# Patient Record
Sex: FEMALE | Race: WHITE | NOT HISPANIC OR LATINO | URBAN - METROPOLITAN AREA
[De-identification: names, ages, dates, MRNs, and addresses within clinical notes are randomized per-mention and may not be internally consistent; named-entity substitution may affect disease eponyms.]

---

## 2018-09-14 ENCOUNTER — OFFICE VISIT CONVERTED (OUTPATIENT)
Dept: OTHER | Facility: HOSPITAL | Age: 38
End: 2018-09-14
Attending: NURSE PRACTITIONER

## 2018-09-20 ENCOUNTER — OFFICE VISIT CONVERTED (OUTPATIENT)
Dept: ONCOLOGY | Facility: HOSPITAL | Age: 38
End: 2018-09-20
Attending: INTERNAL MEDICINE

## 2018-12-19 ENCOUNTER — OFFICE VISIT CONVERTED (OUTPATIENT)
Dept: ONCOLOGY | Facility: HOSPITAL | Age: 38
End: 2018-12-19
Attending: INTERNAL MEDICINE

## 2019-01-11 ENCOUNTER — HOSPITAL ENCOUNTER (OUTPATIENT)
Dept: OTHER | Facility: HOSPITAL | Age: 39
Discharge: HOME | End: 2019-01-11
Attending: NURSE PRACTITIONER

## 2019-01-11 LAB
BASOPHILS # BLD AUTO: 0.07 10*3/UL (ref 0–0.2)
BASOPHILS NFR BLD AUTO: 1.41 % (ref 0–3)
EOSINOPHIL # BLD AUTO: 0.25 10*3/UL (ref 0–0.7)
EOSINOPHIL # BLD AUTO: 5.28 % (ref 0–7)
ERYTHROCYTE [DISTWIDTH] IN BLOOD BY AUTOMATED COUNT: 12.7 % (ref 11.5–14.5)
FERRITIN SERPL-MCNC: 10 NG/ML (ref 10–200)
HBA1C MFR BLD: 11.9 G/DL (ref 12–16)
HCT VFR BLD AUTO: 34.4 % (ref 37–47)
IRON SATN MFR SERPL: 11 % (ref 20–55)
IRON SERPL-MCNC: 41 UG/DL (ref 60–170)
LYMPHOCYTES # BLD AUTO: 1.4 10*3/UL (ref 1–5)
MCH RBC QN AUTO: 32 PG (ref 27–31)
MCHC RBC AUTO-ENTMCNC: 34.5 G/DL (ref 33–37)
MCV RBC AUTO: 92.8 FL (ref 81–99)
MONOCYTES # BLD AUTO: 0.42 10*3/UL (ref 0.2–1.2)
MONOCYTES NFR BLD AUTO: 8.7 % (ref 3–10)
NEUTROPHILS # BLD AUTO: 2.67 10*3/UL (ref 2–8)
NEUTROPHILS NFR BLD AUTO: 55.6 % (ref 30–85)
NRBC BLD AUTO-RTO: 0 % (ref 0–0.01)
PLATELET # BLD AUTO: 249 10*3/UL (ref 130–400)
PMV BLD AUTO: 8.2 FL (ref 7.4–10.4)
RBC # BLD AUTO: 3.71 10*6/UL (ref 4.2–5.4)
TIBC SERPL-MCNC: 390 UG/DL (ref 245–450)
TRANSFERRIN SERPL-MCNC: 273 MG/DL (ref 250–380)
VARIANT LYMPHS NFR BLD MANUAL: 29 % (ref 20–45)
WBC # BLD AUTO: 4.81 10*3/UL (ref 4.8–10.8)

## 2019-01-22 ENCOUNTER — HOSPITAL ENCOUNTER (OUTPATIENT)
Dept: OTHER | Facility: HOSPITAL | Age: 39
Setting detail: RECURRING SERIES
Discharge: HOME | End: 2019-01-31
Attending: NURSE PRACTITIONER

## 2019-03-05 ENCOUNTER — HOSPITAL ENCOUNTER (OUTPATIENT)
Dept: ONCOLOGY | Facility: HOSPITAL | Age: 39
Discharge: HOME | End: 2019-03-05
Attending: INTERNAL MEDICINE

## 2019-03-05 ENCOUNTER — OFFICE VISIT CONVERTED (OUTPATIENT)
Dept: ONCOLOGY | Facility: HOSPITAL | Age: 39
End: 2019-03-05
Attending: NURSE PRACTITIONER

## 2019-03-05 LAB
ALBUMIN SERPL-MCNC: 4.4 G/DL (ref 3.5–5)
ALBUMIN/GLOB SERPL: 1.4 {RATIO} (ref 1.4–2.6)
ALP SERPL-CCNC: 66 U/L (ref 42–98)
ALT SERPL-CCNC: 18 U/L (ref 10–40)
ANION GAP SERPL CALC-SCNC: 14 MMOL/L (ref 8–19)
AST SERPL-CCNC: 18 U/L (ref 15–50)
BASOPHILS # BLD AUTO: 0.03 10*3/UL (ref 0–0.2)
BASOPHILS NFR BLD AUTO: 0.4 % (ref 0–3)
BILIRUB SERPL-MCNC: 0.31 MG/DL (ref 0.2–1.3)
BUN SERPL-MCNC: 11 MG/DL (ref 5–25)
BUN/CREAT SERPL: 16 {RATIO} (ref 6–20)
CALCIUM SERPL-MCNC: 9.6 MG/DL (ref 8.7–10.4)
CHLORIDE SERPL-SCNC: 104 MMOL/L (ref 99–111)
CONV ABS IMM GRAN: 0.02 10*3/UL (ref 0–0.2)
CONV CO2: 28 MMOL/L (ref 22–32)
CONV IMMATURE GRAN: 0.3 % (ref 0–1.8)
CONV TOTAL PROTEIN: 7.6 G/DL (ref 6.3–8.2)
CREAT UR-MCNC: 0.67 MG/DL (ref 0.5–0.9)
DEPRECATED RDW RBC AUTO: 47.5 FL (ref 36.4–46.3)
EOSINOPHIL # BLD AUTO: 0.02 10*3/UL (ref 0–0.7)
EOSINOPHIL # BLD AUTO: 0.3 % (ref 0–7)
ERYTHROCYTE [DISTWIDTH] IN BLOOD BY AUTOMATED COUNT: 13.7 % (ref 11.7–14.4)
FERRITIN SERPL-MCNC: 382 NG/ML (ref 10–200)
GFR SERPLBLD BASED ON 1.73 SQ M-ARVRAT: >60 ML/MIN/{1.73_M2}
GLOBULIN UR ELPH-MCNC: 3.2 G/DL (ref 2–3.5)
GLUCOSE SERPL-MCNC: 85 MG/DL (ref 65–99)
HBA1C MFR BLD: 12.6 G/DL (ref 12–16)
HCT VFR BLD AUTO: 38.7 % (ref 37–47)
IRON SATN MFR SERPL: 27 % (ref 20–55)
IRON SERPL-MCNC: 78 UG/DL (ref 60–170)
LYMPHOCYTES # BLD AUTO: 1.6 10*3/UL (ref 1–5)
MCH RBC QN AUTO: 30.8 PG (ref 27–31)
MCHC RBC AUTO-ENTMCNC: 32.6 G/DL (ref 33–37)
MCV RBC AUTO: 94.6 FL (ref 81–99)
MONOCYTES # BLD AUTO: 0.37 10*3/UL (ref 0.2–1.2)
MONOCYTES NFR BLD AUTO: 4.8 % (ref 3–10)
NEUTROPHILS # BLD AUTO: 5.6 10*3/UL (ref 2–8)
NEUTROPHILS NFR BLD AUTO: 73.3 % (ref 30–85)
NRBC CBCN: 0 % (ref 0–0.7)
OSMOLALITY SERPL CALC.SUM OF ELEC: 293 MOSM/KG (ref 273–304)
PLATELET # BLD AUTO: 275 10*3/UL (ref 130–400)
PMV BLD AUTO: 10.6 FL (ref 9.4–12.3)
POTASSIUM SERPL-SCNC: 3.6 MMOL/L (ref 3.5–5.3)
RBC # BLD AUTO: 4.09 10*6/UL (ref 4.2–5.4)
SODIUM SERPL-SCNC: 142 MMOL/L (ref 135–147)
TIBC SERPL-MCNC: 289 UG/DL (ref 245–450)
TRANSFERRIN SERPL-MCNC: 202 MG/DL (ref 250–380)
VARIANT LYMPHS NFR BLD MANUAL: 20.9 % (ref 20–45)
WBC # BLD AUTO: 7.64 10*3/UL (ref 4.8–10.8)

## 2019-08-29 ENCOUNTER — HOSPITAL ENCOUNTER (OUTPATIENT)
Dept: ONCOLOGY | Facility: HOSPITAL | Age: 39
Discharge: HOME | End: 2019-08-29
Attending: INTERNAL MEDICINE

## 2019-08-29 ENCOUNTER — OFFICE VISIT CONVERTED (OUTPATIENT)
Dept: ONCOLOGY | Facility: HOSPITAL | Age: 39
End: 2019-08-29
Attending: INTERNAL MEDICINE

## 2019-08-29 LAB
BASOPHILS # BLD AUTO: 0.03 10*3/UL (ref 0–0.2)
BASOPHILS NFR BLD AUTO: 0.8 % (ref 0–3)
CONV ABS IMM GRAN: 0 10*3/UL (ref 0–0.2)
CONV IMMATURE GRAN: 0 % (ref 0–1.8)
DEPRECATED RDW RBC AUTO: 43.1 FL (ref 36.4–46.3)
EOSINOPHIL # BLD AUTO: 0.02 10*3/UL (ref 0–0.7)
EOSINOPHIL # BLD AUTO: 0.5 % (ref 0–7)
ERYTHROCYTE [DISTWIDTH] IN BLOOD BY AUTOMATED COUNT: 12.1 % (ref 11.7–14.4)
FERRITIN SERPL-MCNC: 96 NG/ML (ref 10–200)
HCT VFR BLD AUTO: 39.3 % (ref 37–47)
HGB BLD-MCNC: 13.1 G/DL (ref 12–16)
IRON SATN MFR SERPL: 34 % (ref 20–55)
IRON SERPL-MCNC: 111 UG/DL (ref 60–170)
LYMPHOCYTES # BLD AUTO: 1.27 10*3/UL (ref 1–5)
LYMPHOCYTES NFR BLD AUTO: 32.2 % (ref 20–45)
MCH RBC QN AUTO: 32.2 PG (ref 27–31)
MCHC RBC AUTO-ENTMCNC: 33.3 G/DL (ref 33–37)
MCV RBC AUTO: 96.6 FL (ref 81–99)
MONOCYTES # BLD AUTO: 0.25 10*3/UL (ref 0.2–1.2)
MONOCYTES NFR BLD AUTO: 6.3 % (ref 3–10)
NEUTROPHILS # BLD AUTO: 2.38 10*3/UL (ref 2–8)
NEUTROPHILS NFR BLD AUTO: 60.2 % (ref 30–85)
NRBC CBCN: 0 % (ref 0–0.7)
PLATELET # BLD AUTO: 242 10*3/UL (ref 130–400)
PMV BLD AUTO: 10.4 FL (ref 9.4–12.3)
RBC # BLD AUTO: 4.07 10*6/UL (ref 4.2–5.4)
TIBC SERPL-MCNC: 327 UG/DL (ref 245–450)
TRANSFERRIN SERPL-MCNC: 229 MG/DL (ref 250–380)
WBC # BLD AUTO: 3.95 10*3/UL (ref 4.8–10.8)

## 2021-05-14 NOTE — PROGRESS NOTES
"   Progress Note      Patient Name: Lucretia Barnett   Patient ID: [id number]   Sex: Female   YOB: 1980    Primary Care Provider: Gifty NIEVES   Referring Provider: Gifty NIEVES    Visit Date: September 14, 2018    Provider: EZEQUIEL Gallardo   Location: [facility]   Location Address: [address] [place] [address]  [id number]   Location Phone: [phone]          Chief Complaint  · syncope  · anemia      History Of Present Illness  Lucretia Barnett is a 38 year old /White female who presents for evaluation and treatment of:      Syncope:  Patient reports passing out in the shower Monday morning;said she was out for a couple of minutes. Woke up in the shower with no recollection of what had happened.  Patient reports she had no difficulty with confusion afterwards. She does admit that when she woke up Monday morning and got out of bed that she felt a \"little funny\".   She said it is hard to describe.  Patient denies any injuries from the fall.     Patient works at [employer] and she went on to work that morning and they checked her sugar and it was normal according to patient.  She also had a CBC done and she was found to be anemic with hemoglobin of 9.5 and hematocrit of 30.3. Patient started an iron pill over the counter (OTC) 65 mg (4 tablets)on her own which she has been taking about four days now.  She reports she has felt better since starting it; not feeling as fatigued. Patient denies rectal bleeding, hematuria, coughing up any blood.  Patient has regular menses with her last ending 8/26/18 she reports they last 5-6 days and she reports she will pass clots during her menses, but denies that they are unusually extra heavy. Patient has never had any difficulty with anemia before.  Patient denies shortness of breath, dizziness, chest pain, or palpitations.  Patient takes no routine medications at all.       Past Medical " "History  Migraine         Medication List  Name Date Started Instructions   iron 325 mg (65 mg iron) oral tablet  take 4 tablets by oral route daily         Allergy List  NO KNOWN DRUG ALLERGIES         Family Medical History  Cancer, Unspecified         Social History  Alcohol (Never); ; Recreational Drug Use (Never); Tobacco         Review of Systems  · Constitutional  o Admits  o : weight loss, fatigue  o Denies  o : fever, chills, loss of appetite  · HENT  o Denies  o : headaches  · Cardiovascular  o Denies  o : dypnea on exertion, lower extremity edema, pain in chest, palpitations  · Respiratory  o Denies  o : shortness of breath, cough, hemoptysis  · Gastrointestinal  o Denies  o : nausea, vomiting, abdominal pain, bloating, hematemesis, melena  · Genitourinary  o Denies  o : hematuria  · Integument  o Denies  o : rash, itching, nail changes  · Neurologic  o Admits  o : altered mental status/change in consciousness, additional neurologic symptoms except as noted in the HPI  · Musculoskeletal  o Denies  o : joint pain, muscle pain, muscular weakness  · Heme-Lymph  o Denies  o : easy bruising, easy bleeding, history of excessive bleeding      Vitals  Date Time BP Position Site L\R Cuff Size HR RR TEMP(F) WT  HT  BMI kg/m2 BSA m2 O2 Sat HC       09/14/2018 02:52 /62 Sitting    60 - R 18 98 197lbs 0oz 5'  6\" 31.8 2.04 99 %           Physical Examination  · Constitutional  o Appearance  o : obese, well developed, in no acute distress  · Head and Face  o Head  o :   § Inspection  § : atraumatic, normocephalic  · Eyes  o Eyes  o : extraocular movements intact, no scleral icterus, no conjunctival injection  · Ears, Nose, Mouth and Throat  o Ears  o :   § External Ears  § : normal  § Otoscopic Examination  § : tympanic membrane appearance within normal limits bilaterally  o Nose  o :   § Intranasal Exam  § : nares patent  o Oral Cavity  o :   § Oral Mucosa  § : moist mucous membranes  o Throat  o : "   § Oropharynx  § : no inflammation or lesions present  · Neck  o Thyroid  o : nontender, no nodules or masses present on palpation, symmetric  · Respiratory  o Respiratory Effort  o : breathing comfortably, symmetric chest rise  o Auscultation of Lungs  o : clear to asculatation bilaterally, no wheezes, rales, or rhonchii  · Cardiovascular  o Heart  o :   § Auscultation of Heart  § : regular rate and rhythm, no murmurs, rubs, or gallops  o Peripheral Vascular System  o :   § Extremities  § : no edema  · Gastrointestinal  o Abdominal Examination  o :   § Abdomen  § : bowel sounds present, non-distended, non-tender  · Lymphatic  o Neck  o : no lymphadenopathy present  · Skin and Subcutaneous Tissue  o General Inspection  o : no lesions present, no areas of discoloration, skin turgor normal, pink, warm, and dry  · Neurologic  o Mental Status Examination  o :   § Orientation  § : grossly oriented to person, place and time  o Cranial Nerves  o : crainial nerves 2-12 grossly intact  o Gait and Station  o :   § Gait Screening  § : normal gait  · Psychiatric  o General  o : normal mood and affect              Assessment  · Anemia     285.9/D64.9  Labs done today in office. Hematology consult Dr. Maldonado or Dr. Harvey. Continue OTC iron supplement for now.  · Syncope     780.2/R55      Plan  · Orders  o HEMATOLOGY/ONCOLOGY CONSULTATION (HEMOC) - 285.9/D64.9 - 09/16/2018   Dr. Veza ASAP. [facility] or [place] office - whichever is sooner. Any day, any time.   Please send CBC that patient brought in from [facility] and all the labs I ordered today along with office note.  o CBC with Auto Diff [facility] (79474) - 780.2/R55, 285.9/D64.9 - 09/14/2018  o CMP [facility] (00972) - 780.2/R55, 285.9/D64.9 - 09/14/2018  o ACO-39: Current medications updated and reviewed () - - 09/14/2018  o ACO-14: Influenza immunization administered or previously received () - - 09/14/2018  o Iron Profile (Iron 15855 TIBC 74796 and Transferrin 89547) (IRONP) -  780.2/R55, 285.9/D64.9 - 09/14/2018  o Ferritin (14014) - 780.2/R55, 285.9/D64.9 - 09/14/2018  o B12 Folate levels (B12FO) - 780.2/R55, 285.9/D64.9 - 09/14/2018  o PT/INR (90857) - 780.2/R55, 285.9/D64.9 - 09/14/2018  · Instructions  o Take all medications as prescribed/directed.  o Patient was educated/instructed on their diagnosis, treatment and medications prior to discharge from the clinic today.  o Patient instructed to seek medical attention urgently for new or worsening symptoms.  o Call the office with any concerns or questions.  o Chronic conditions reviewed and taken into consideration for today's treatment plan.  · Disposition  o RTC in 3 months            Electronically Signed by: EZEQUIEL Gallardo -Author on September 16, 2018 03:10:55 PM

## 2021-05-16 VITALS
TEMPERATURE: 98 F | RESPIRATION RATE: 18 BRPM | HEIGHT: 66 IN | DIASTOLIC BLOOD PRESSURE: 62 MMHG | HEART RATE: 60 BPM | BODY MASS INDEX: 31.66 KG/M2 | OXYGEN SATURATION: 99 % | WEIGHT: 197 LBS | SYSTOLIC BLOOD PRESSURE: 110 MMHG

## 2021-05-28 VITALS
BODY MASS INDEX: 29.78 KG/M2 | SYSTOLIC BLOOD PRESSURE: 124 MMHG | RESPIRATION RATE: 16 BRPM | DIASTOLIC BLOOD PRESSURE: 76 MMHG | OXYGEN SATURATION: 100 % | OXYGEN SATURATION: 100 % | SYSTOLIC BLOOD PRESSURE: 133 MMHG | HEIGHT: 69 IN | HEART RATE: 51 BPM | TEMPERATURE: 98.9 F | WEIGHT: 210.32 LBS | DIASTOLIC BLOOD PRESSURE: 66 MMHG | HEART RATE: 50 BPM | BODY MASS INDEX: 31.15 KG/M2 | TEMPERATURE: 98.4 F | WEIGHT: 201.06 LBS

## 2021-05-28 VITALS
BODY MASS INDEX: 29.84 KG/M2 | RESPIRATION RATE: 16 BRPM | HEART RATE: 50 BPM | SYSTOLIC BLOOD PRESSURE: 121 MMHG | HEIGHT: 69 IN | DIASTOLIC BLOOD PRESSURE: 43 MMHG | OXYGEN SATURATION: 100 % | WEIGHT: 201.5 LBS | TEMPERATURE: 98.4 F

## 2021-05-28 VITALS
HEART RATE: 53 BPM | TEMPERATURE: 98.8 F | DIASTOLIC BLOOD PRESSURE: 74 MMHG | HEIGHT: 67 IN | SYSTOLIC BLOOD PRESSURE: 130 MMHG | OXYGEN SATURATION: 98 % | BODY MASS INDEX: 30.85 KG/M2

## 2021-05-28 NOTE — PROGRESS NOTES
Patient: AXEL CACERES     Acct: [id number]     Report: #[id number]  UNIT #: [id number]     : 1980    Encounter Date:2018  PRIMARY CARE: NEY GRAY  ***Signed***  --------------------------------------------------------------------------------------------------------------------  DATE: 18      Primary Care Provider:  NEY GRAY      Referring Provider:  NEY GRAY      Reason For Consult      New Patient Anemia            History of Present Illness      38-year-old white female is referred because of anemia.      Last Monday patient was bruising when she got out of bed.  She went to the     shower, felt weird and passed out.   picked her up and put her on the     bed.            She went to see her family care provider who did blood work that showed a low     white count of 3.7, hemoglobin of 10.5/hematocrit of 33.1.  Vitamin B12 was     normal.  Folic acid normal.  Iron was low at 25.  TIBC high 480, percent     saturation 5%.            Patient was referred for evaluation and treatment.            Past Medical/Surgical History             No Hypertension             No Diabetes Mellitus             No Heart Disease             No Blood Clots             No Cancer             No Lung Disease             No Kidney Disease             No Other            1 childbirth            Pyschiatric History      No Depression, No Anxiety, No Panic Attacks, No Bipolar, No Other            Social History      Social History:  No Tobacco Use, No Alcohol Use, No Recreational Drug use, No     Other            Family History      Hypertension (father); No Diabetes Mellitus, No Heart Disease, No Blood Clots;     Cancer (father); No Lung Disease, No Kidney Disease, No Other            Allergies      Coded Allergies:             No Known Drug Allergies (Verified  Allergy, 18)            Medications      (iron) Unknown Strength  No Conflict Check      PO QDAY         Reported          9/20/18      Current Medications      Current Medications Reviewed 9/20/18            Pain Assessment      Description:  None            Review of Systems      Abnormal as noted below; all other systems have been reviewed and are negative.      General:  No Anxiety; Fatigue Scale: (3/4); No Pain Scale:, No Fever, No Other      HEENT:  No Dysphagia, No Hearing Changes, No Rhinorrhea, No Tinnitus, No Visual     Changes, No Nasal Congestion, No Epistaxis, No Other      Respiratory:  No Cough, No Shortness of Air, No Sputum Changes, No Wheezing, No     Hemoptysis, No Congestion, No Other      Cardiovascular:  No Chest Pain, No Pedal Edema, No Orthopnea, No Palpitations,     No Chest Pressure, No Dizziness, No Other      Gastrointestinal:  No Nausea, No Vomiting, No Dysphagia; Constipation; No D    iarrhea; Appetite Good; No Appetite Fair, No Appetite Poor, No Early Satiety, No     Other      Genitourinary:  No Nocturia, No Dysuria, No Other      Musculoskeletal:  No Joint Effusions, No Joint Tenderness, No Joint Stiffness,     No Myalgias, No Aches, No Pains, No Other      Endocrine:  No Heat Intolerance, No Cold Intolerance, No Fatigue, No Blood Sugar     Control, No Other      Hematologic:  No Bleeding; Bruising; No Swollen Glands, No Other      Allergic/Immunologic:  No Hives, No Throat closing off, No Nasal drip, No Itchy     eyes, No Hay fever, No Other      Psychological:  No Anxiety, No Depression, No Other      Neurological:  No Headaches, No Dizziness, No Weakness, No Numbness, No Other      Skin:  No Rash, No Open Wounds, No Edema, No Other      Vitals:             Height 5 ft 7 in / 170.18 cm           Weight 1919 lbs 7 oz / 870.704020 kg           BSA 7.09 m2           .6 kg/m2           Temperature 98.8 F / 37.11 C - Temporal           Pulse 53           Blood Pressure 130/74 Sitting, Left Arm           Pulse Oximetry 98%, room air            Exam      Constitutional:  No acute distress,  Conversant, Pleasant      Eyes:  Anicteric sclerae, Palpebral Conjunctivae (pink), ABIEL      HENT:  Oropharynx clear; No Erythema; Buccal mucosae (pink)      Neck:  Supple, Full Range of Motion      Lungs:  Clear to Ausculation, Normal Respiratory Effort      Cardiovascular:  RRR; No Murmurs; Normal PMI; No Peripheral Edema      Abdomen:  Soft, NABS; No Tenderness      Skin:  Other (no dermatosis)      Extremities:  No digital cyanosis, No digital ischemia, Normal gait, Other (no     deformity , no limitation in range of motion)      Psychiatric:  Appropriate affect, Intact judgement, AAO x 3      Neurological:  Cranial Nerve II-XII; No Focal Sensory deficits      Lymphatic:  No Neck            Lab Results      Dictated in H  CMP normal      PT 12.3, INR 1.23            Impression/Problem List      Iron deficiency anemia probably from her monthly menstrual flow.            Plan      Venofer 300 mg IV Ã—2      Continue ferrous sulfate 3 times a day with vitamin C 2 times a day      Return in 2 months with CBC, iron profile, reticulocyte count.      Thank you very much for allowing me to participate in the care of your patient.            Patient Education:        Anemia            PREVENTION      Hx Influenza Vaccination:  Yes      Date Influenza Vaccine Given:  Sep 11, 2018      Influenza Vaccine Declined:  No      2 or More Falls Past Year?:  No      Fall Past Year with Injury?:  No      Encouraged to follow-up with:  PCP regarding preventative exams.      Chart initiated by      Renuka George MA                 Disclaimer: Converted document may not contain table formatting or lab diagrams. Please see [employer] for the authenticated document.

## 2021-05-28 NOTE — PROGRESS NOTES
Patient: AXEL CACERES     Acct: [id number]     Report: #[id number]  UNIT #: [id number]     : 1980    Encounter Date:2019  PRIMARY CARE: NEY GRAY [facility]  ***Signed***  --------------------------------------------------------------------------------------------------------------------  NURSE INTAKE      Visit Type      Established Patient Visit            Referring Provider/Copies To      Provider Not Found in Lookup:  NEY GRAY      PCP Not Found in Lookup:  NEY GRAY            History and Present Illness      Past History      Mrs. Caceres is a 37yo WF with HARRIS.  This was found a few months ago when she    had a near syncopal episode at work. She denies bleeding such as hematemesis,     hematochezia, melena or hematuria.  She reports she had 2 iron infusions in     November with improvement of her fatigue.  Iron profile/ferritin 18 iron 2    5, % sat 5, tibc 480 and ferritin 22--18 iron 50, % sat 15 with ferritin     17.4.  She reports taking PO iron TID and tolerating w/o issue.  She continues     to have monthly menstruation; however, reports that she does not think     excessive-uses a box of tampons per cycle.  Has not seen gyn for some time.      Denies drinking/smoking.  Unaware of family hx of anemia.            18:  Iron 25  Percent saturation 5  TIBC 480  Ferritin 22            18:  Received Iron x 2 in November.             18:  Iron 296  TIBC 402  Percent saturation 74  Ferritin 74  Hemoglobin 12     MCV 90            19:  Iron 41  TIBC 390  Percent saturation 11  Ferritin 10  Hemoglobin     11.9  MCV 92            19:  Iron 78  TIBC 289  Percent saturation 27  Ferritin 382  Hemoglobin     12.6  MCV 94            HPI - Hematology Interim      F/u HARRIS--continues with fatigue like experienced with iron deficiency.  She is     taking PO iron BID w/o issues.  Does have a menstrual cycle; however, not very     heavy.  Denies  bleeding or excess bruising at this time. No recent lab work     completed. No distress noted.            PAST, FAMILY   Past Medical History      Other PMH:        HARRIS      Hematology/Oncology (F):  Anemia            Past Surgical History      None            Family History      Family History:  Prostate Cancer (GRANDFATHER), Stomach Cancer (AUNT)            Social History      Marital Status:        Lives independently:  Yes      Number of Children:  1      Occupation:  [employer]            Tobacco Use      Tobacco status:  Never smoker            Alcohol Use      Alcohol intake:  None            Substance Use      Substance use:  Denies use            REVIEW OF SYSTEMS      General:  Admits: Fatigue      Eye:  Denies: Corrective Lenses      ENT:  Denies: Hearing Loss      Cardiovascular:  Denies: Chest Pain      Respiratory:  Denies: Shortness of Air      Musculoskeletal:  Denies: Muscle Pain      Neurologic:  Admits: Dizziness      Psychiatric:  Denies: Anxiety      Endocrine:  Denies: Diabetes            VITAL SIGNS AND SCORES      Vitals      Weight 210 lbs 5.101 oz / 95.4 kg      Temperature 98.4 F / 36.89 C - Temporal      Pulse 50      Respirations 16      Blood Pressure 124/76 Sitting, Left Arm      Pulse Oximetry 100%, RM AIR            Pain Score      Pain Scale Used:  Numerical      Pain Intensity:  0            EXAM      General Appearance:  Positive for: Alert, Oriented x3, Cooperative;          Negative for: Acute Distress      Respiratory:  Positive for: CTAB, Normal Respiratory Effort      Abdomen/Gastro:  Positive for: Normal Active Bowel Sounds, Soft;          Negative for: Distention, Hepatosplenomegaly, Tenderness      Cardiovascular:  Positive for: RRR;          Negative for: Gallop, Murmur, Peripheral Edema, Rub      Psychiatric:  Positive for: Appropriate Affect, Intact Judgement            PREVENTION      Hx Influenza Vaccination:  Yes      Date Influenza Vaccine  Given:  Sep 11, 2018      Influenza Vaccine Declined:  No      2 or More Falls Past Year?:  No      Fall Past Year with Injury?:  No      Encouraged to follow-up with:  PCP regarding preventative exams.      Chart initiated by      NOHELIA IBRAHIM MA            ALLERGY/MEDS      Allergies      Coded Allergies:             NO KNOWN DRUG ALLERGIES (Verified  Allergy, Unknown, 8/29/19)            Medications      Last Reconciled on 8/29/19 11:46 by EZEQUIEL JOLLY      Cyanocobalamin (Vitamin B-12*) 50 Mcg Tablet      50 MCG PO QDAY, TAB         Reported         12/19/18       Ferrous Sulfate (Iron Sulfate*) 325 Mg Tablet      65 MG PO QDAY, #30 TAB 0 Refills         Reported         12/19/18      Medications Reviewed:  No Changes made to meds            IMPRESSION/PLAN      Diagnosis      ANEMIA, UNSPECIFIED - D64.9            Notes      Iron deficiency anemia.  Status post IV iron.  Currently on oral iron.  Recent     upper and lower endoscopy does not identify any obvious bleeding.  She reports     more fatigue again.  Repeat lab work today including CBC and iron profile.  If     it remains low despite oral iron supplementation, additional treatment with IV     iron would be appropriate.      New Diagnostics      * CBC With Auto Diff, Routine         Dx: ANEMIA, UNSPECIFIED - D64.9      * Iron Profile, Routine         Dx: ANEMIA, UNSPECIFIED - D64.9      * Ferritin Level, Routine         Dx: ANEMIA, UNSPECIFIED - D64.9            Patient Education            Anemia      Patient Education Provided:  Yes                 Disclaimer: Converted document may not contain table formatting or lab diagrams. Please see [employer] for the authenticated document.

## 2021-05-28 NOTE — PROGRESS NOTES
Patient: AXEL CACERES     Acct: [id number]     Report: #[id number]  UNIT #: [id number]     : 1980    Encounter Date:2019  PRIMARY CARE: NEY GRAY [facility]  ***Signed***  --------------------------------------------------------------------------------------------------------------------  NURSE INTAKE      Visit Type      Established Patient Visit            Chief Complaint      HARRIS            Referring Provider/Copies To      Provider Not Found in Lookup:  NEY GRAY      PCP Not Found in Lookup:  NEY GRAY            History and Present Illness      Past History      Mrs. Caceres is a 39yo WF with HARRIS.  This was found a few months ago when she    had a near syncopal episode at work. She denies bleeding such as hematemesis,     hematochezia, melena or hematuria.  She reports she had 2 iron infusions in     November with improvement of her fatigue.  Iron profile/ferritin 18 iron     25, % sat 5, tibc 480 and ferritin 22--18 iron 50, % sat 15 with ferritin     17.4.  She reports taking PO iron TID and tolerating w/o issue.  She continues     to have monthly menstruation; however, reports that she does not think     excessive-uses a box of tampons per cycle.  Has not seen gyn for some time.      Denies drinking/smoking.  Unaware of family hx of anemia.            18:  Iron 25  Percent saturation 5  TIBC 480  Ferritin 22            18:  Received Iron x 2 in November.             18:  Iron 296  TIBC 402  Percent saturation 74  Ferritin 74  Hemoglobin 12     MCV 90            19:  Iron 41  TIBC 390  Percent saturation 11  Ferritin 10  Hemoglobin     11.9  MCV 92            19:  Iron 78  TIBC 289  Percent saturation 27  Ferritin 382  Hemoglobin     12.6  MCV 94            HPI - Hematology Interim      Presents today to follow up on her HARRIS.  Received iron November.  Has been     taking oral iron twice a day.  Tolerating well.  Has seen GYN and  doesn't     consider them heavy.  No hematemesis, melena, BRB with bowel.            PAST, FAMILY   Past Medical History      Other PMH      NONE      Hematology/Oncology (F):  Anemia            Past Surgical History      NONE            Family History      Family History:  Prostate Cancer (GRANDFATHER), Stomach Cancer (AUNT)            Social History      Marital Status:        Lives independently:  Yes      Number of Children:  1      Occupation:  [employer]            Tobacco Use      Tobacco status:  Never smoker            Alcohol Use      Alcohol intake:  None            Substance Use      Substance use:  Denies use            REVIEW OF SYSTEMS      General:  Admits: Fatigue      Eye:  Denies: Blurred Vision      ENT:  Denies: Headache      Cardiovascular:  Denies: Chest Pain      Respiratory:  Denies: Coughing Blood      Gastrointestinal:  Denies: Bloody Stools      Genitourinary (female):  Denies: Blood in Urine      Musculoskeletal:  Denies: Back Pain      Integumentary:  Admits: Bruises Easily      Reproductive:  Admits: Heavy Periods            VITAL SIGNS AND SCORES      Vitals      Height 5 ft 8.90 in / 175 cm      Weight 201 lbs 8.007 oz / 91.4 kg      BSA 2.07 m2      BMI 29.8 kg/m2      Temperature 98.4 F / 36.89 C - Temporal      Pulse 50      Respirations 16      Blood Pressure 121/43 Sitting, Left Arm      Pulse Oximetry 100%, RM AIR            Pain Score      Pain Scale Used:  Numerical      Pain Intensity:  0            Fatigue Score      Fatigue (0-10 scale):  3            EXAM      General Appearance:  Positive for: Alert, Oriented x3, Cooperative      Eye:  Positive for: Anicteric Sclerae, Moist Conjunctiva, Reactive to Light      HEENT:  Positive for: Oropharynx clear;          Negative for: Erythema, Exudates      Neck:  Positive for: Full ROM, Supple;          Negative for: Masses      Respiratory:  Positive for: CTAB, Normal Respiratory Effort      Abdomen/Gastro:   Positive for: Normal Active Bowel Sounds, Soft;          Negative for: Hepatosplenomegaly, Tenderness      Skin:  Positive for: Normal Texture and Turgor      Psychiatric:  Positive for: AAO X 3, Appropriate Affect, Intact Judgement      Neurologic:  Positive for: Cranial Ner II-XII Intact      Musculoskeletal:  Positive for: Full ROM Lower Extremety, Full ROM Upper     Extremety, Full Muscle Strength, Full Muscle Tone      Lower Extremities:  Positive for: Normal Gait and Station      Upper Extremities:  Negative for: Digital Cyanosis, Digital Ischemia            PREVENTION      Date Influenza Vaccine Given:  Sep 11, 2018      Influenza Vaccine Declined:  No      2 or More Falls Past Year?:  No      Fall Past Year with Injury?:  No      Encouraged to follow-up with:  PCP regarding preventative exams.      Chart initiated by      NOHELIA IBRAHIM MA            ALLERGY/MEDS      Allergies      Coded Allergies:             NO KNOWN DRUG ALLERGIES (Verified  Allergy, Unknown, 3/5/19)            Medications      Last Reconciled on 12/19/18 12:57 by EZEQUIEL JOLLY      Tadeo-Fluticasone (Fluticasone 50 mcg) 16 Gm Spray.susp      1 PUFFS NARE EACH QDAY, #1 BOTTLE 0 Refills         Reported         1/22/19       Ascorbic Acid (Vitamin C*) 500 Mg Tablet      500 MG PO QDAY, #60 TAB 0 Refills         Reported         12/19/18       Cyanocobalamin (Vitamin B-12*) 50 Mcg Tablet      50 MCG PO QDAY, TAB         Reported         12/19/18       Ferrous Sulfate (Iron Sulfate*) 325 Mg Tablet      65 MG PO QDAY, #30 TAB 0 Refills         Reported         12/19/18      Medications Reviewed:  No Changes made to meds            IMPRESSION/PLAN      Impression      Iron deficiency anemia            Diagnosis      ANEMIA, UNSPECIFIED - D64.9            Notes      New Diagnostics      * CBC, As Soon As Possible         Dx: ANEMIA, UNSPECIFIED - D64.9      * Comp Metabolic Panel, As Soon As Possible      * Ferritin Level, As Soon As  Possible      * Iron Profile, As Soon As Possible            Plan      1.  CBC and iron studies normal.  Patient called with labs.       2.  Appt made with surgeon for EGD and colonoscopy.  Patient to discuss if     needed with surgeon.       3.  Follow up in 6 months to repeat studies or sooner if needed.            Patient Education      Patient Education Provided:  Yes                 Disclaimer: Converted document may not contain table formatting or lab diagrams. Please see [employer] for the authenticated document.

## 2021-05-28 NOTE — PROGRESS NOTES
Patient: AXEL CACERES     Acct: [id number]     Report: #[id number]  UNIT #: [id number]     : 1980    Encounter Date:2018  PRIMARY CARE: NEY GRAY [facility]  ***Signed***  --------------------------------------------------------------------------------------------------------------------  NURSE INTAKE      Visit Type      New Patient Visit            Chief Complaint      ANEMIA            Referring Provider/Copies To      Provider Not Found in Lookup:  NEY GRAY      PCP Not Found in Lookup:  NEY GRAY            History and Present Illness      Past History      Mrs. Caceres is a 37yo WF with HARRIS.  This was found a few months ago when she    had a near syncopal episode at work. She denies bleeding such as hematemesis,     hematochezia, melena or hematuria.  She reports she had 2 iron infusions in     November with improvement of her fatigue; however, after a wk or so.  Iron     profile/ferritin 18 iron 25, % sat 5 and ferritin 22--18 iron 50, %     sat 15 with ferritin 17.4.  She reports taking PO iron TID and tolerating w/o     issue.  She continues to have monthly menstruation; however, reports that she     does not think excessive-uses a box of tampons per cycle.  Has not seen gyn for     some time.  Denies drinking/smoking.  Unaware of family hx of anemia.            HPI - Hematology Interim      Pt initial presentation to clinic.  Reports fatigue is ongoing-Feraheme infusion    (per Dr. Harvey @ [place]) helped but did not last long.  She denies     hematochezia, hematuria or hemoptysis at this time.  Pt reports unusual     bruising-has bruise behind knee and has no idea how it happened.  Menstrual cyc    le continues to be fairly heavy.  Denies distress at this time.            PAST, FAMILY   Past Medical History      Other PMH      NONE      Hematology/Oncology (F):  Anemia            Past Surgical History      NONE            Family History      Family History:   Prostate Cancer (GRANDFATHER), Stomach Cancer (AUNT)      DAD HAD A SPOT TOOK OUT OF HIS LEG; CANCEROUS            Social History      Marital Status:        Lives independently:  Yes      Number of Children:  1      Occupation:  [employer]      MOTHER AND FATHER ARE STILL LIVING            Tobacco Use      Tobacco status:  Never smoker            Alcohol Use      Alcohol intake:  None            Substance Use      Substance use:  Denies use            REVIEW OF SYSTEMS      General:  Admits: Fatigue;          Denies: Appetite Change, Fever, Night Sweats, Weight Gain, Weight Loss      Eye:  Denies: Blurred Vision, Corrective Lenses, Diplopia, Eye Irritation, Eye     Pain, Eye Redness, Spots in Vision, Vision Loss      ENT:  Denies: Headache, Hearing Loss, Hoarseness, Seizures, Sinus Congestion,     Sore Throat      Cardiovascular:  Denies: Chest Pain, Edema Ankles, Edema Legs, Irregular     Heartbeat, Palpitations      Respiratory:  Denies: Coughing Blood, Productive Cough, Shortness of Air,     Wheezing      Gastrointestinal:  Denies: Bloody Stools, Constipation, Diarrhea, Frequent     Heartburn, Nausea, Problem Swallowing, Tarry Stools, Unable to Control Bowels,     Vomiting      Genitourinary (female):  Denies: Blood in Urine, Decrease Urine Stream, Frequent    Urination, Incontinence, Painful Urination      Musculoskeletal:  Denies: Back Pain, Leg Cramps, Muscle Pain, Muscle Weakness,     Painful Joints, Swollen Joints      Integumentary:  Denies: Bleeds Easily, Bruises Easily, Hair Changes, Jaundice,     Lesions, Mole Changes, Nail Changes, Pigment Changes, Rash, Skin Discoloration      Neurologic:  Denies: Dizziness, Fainting, Numbness\Tingling, Paralysis, Seizures      Psychiatric:  Denies: Anxiety, Confused, Depression, Disoriented, Memory Loss      Endocrine:  Denies: Cold Intolerance, Diabetes, Excessive Sweating, Excessive     Thirst, Excessive Urination, Heat Intolerance,  Flushing, Hyperthyroidism,     Hypothyroidism      Hematologic/Lymphatic:  Admits: Bruising;          Denies: Bleeding, Enlarged Lymph Nodes, Recurrent Infections, Transfusions      Reproductive:  Admits: Heavy Periods, Still Menstruating;          Denies: Menopause, Pregnant            VITAL SIGNS AND SCORES      Vitals      Height 5 ft 8.9 in / 175 cm      Weight 201 lbs 0.952 oz / 91.2 kg      BSA 2.07 m2      BMI 29.8 kg/m2      Temperature 98.9 F / 37.17 C - Temporal      Pulse 51      Blood Pressure 133/66 Sitting, Left Arm      Pulse Oximetry 100%, ROOM AIR            Pain Score      Experiencing any pain?:  No      Pain Scale Used:  Numerical      Pain Intensity:  0            Fatigue Score      Experiencing any fatigue?:  Yes      Fatigue (0-10 scale):  7            EXAM      General Appearance:  Positive for: Alert, Oriented x3, Cooperative;          Negative for: Acute Distress      Eye:  Positive for: Anicteric Sclerae, Moist Conjunctiva      Neck:  Positive for: Supple;          Negative for: JVD, Masses      Respiratory:  Positive for: CTAB, Normal Respiratory Effort      Abdomen/Gastro:  Positive for: Normal Active Bowel Sounds, Soft;          Negative for: Distention, Hepatosplenomegaly, Tenderness      Cardiovascular:  Positive for: RRR;          Negative for: Gallop, Murmur, Peripheral Edema, Rub      Psychiatric:  Positive for: Appropriate Affect, Intact Judgement      Lower Extremities:  Negative for: Edema      Upper Extremities:  Negative for: Clubbing, Digital Cyanosis, Digital Ischemia      Lymphatic:  Negative for: Cervical, Infraclavicular, Supraclavicular            PREVENTION      Hx Influenza Vaccination:  Yes      Date Influenza Vaccine Given:  Sep 11, 2018      Influenza Vaccine Declined:  No      2 or More Falls Past Year?:  No      Fall Past Year with Injury?:  No      Hx Pneumococcal Vaccination:  No      Encouraged to follow-up with:  PCP regarding preventative exams.      Chart  initiated by      WIL ROMO [facility]            ALLERGY/MEDS      Allergies      Coded Allergies:             NO KNOWN DRUG ALLERGIES (Verified  Allergy, Unknown, 12/19/18)            Medications      Last Reconciled on 12/19/18 12:57 by EZEQUIEL JOLLY      Ascorbic Acid (Vitamin C*) 500 Mg Tablet      500 MG PO QDAY, #60 TAB 0 Refills         Reported         12/19/18       Cyanocobalamin (Vitamin B-12*) 50 Mcg Tablet      50 MCG PO QDAY, TAB         Reported         12/19/18       Ferrous Sulfate (Iron Sulfate*) 325 Mg Tablet      65 MG PO QDAY, #30 TAB 0 Refills         Reported         12/19/18      Medications Reviewed:  No Changes made to meds            IMPRESSION/PLAN      Impression      Iron deficiency anemia likely related to ongoing menstrual blood loss            Diagnosis      Iron deficiency anemia         Iron deficiency anemia, unspecified iron deficiency anemia type - D50.9         Iron deficiency anemia type: unspecified iron deficiency      Plan to check stool for occult blood to ensure no additional issues.  If     positive, referral to GI.  Repeat lab work today as outlined below.  She is on     oral iron 3 times daily which she seems to be tolerating.  If the blood counts     and iron parameters are improving then this can be continued.  We discussed GYN     evaluation to try to control her heavy menstrual cycles and she will make that     appointment herself as she works right next to them.  If the blood counts have     not shown improvement then consideration for intravenous iron therapy once     again.      New Diagnostics      * Iron Profile, Routine      * Ferritin Level, Routine      * CBC With Auto Diff, Routine      * Stool Occult Blood, Routine      * Path Review Peripheral Smear, Routine      * B12   * Iron Profile, Month      * CBC With Auto Diff, Month      * Ferritin Level, Month            Notes      New Medications      * FERROUS SULFATE (Iron Sulfate*) 325 MG TABLET: 65  MG PO QDAY #30      * Cyanocobalamin (Vitamin B-12*) 50 MCG TABLET: 50 MCG PO QDAY      * ASCORBIC ACID (Vitamin C*) 500 MG TABLET: 500 MG PO QDAY #60            Patient Education            DI for Iron Deficiency Anemia-Adult      Patient Education Provided:  Yes                 Disclaimer: Converted document may not contain table formatting or lab diagrams. Please see [employer] [facility] for the authenticated document.